# Patient Record
Sex: FEMALE | Race: OTHER | NOT HISPANIC OR LATINO | URBAN - METROPOLITAN AREA
[De-identification: names, ages, dates, MRNs, and addresses within clinical notes are randomized per-mention and may not be internally consistent; named-entity substitution may affect disease eponyms.]

---

## 2017-05-08 ENCOUNTER — EMERGENCY (EMERGENCY)
Facility: HOSPITAL | Age: 32
LOS: 1 days | Discharge: HOME | End: 2017-05-08
Admitting: EMERGENCY MEDICINE
Payer: COMMERCIAL

## 2017-05-08 VITALS
HEART RATE: 85 BPM | SYSTOLIC BLOOD PRESSURE: 125 MMHG | RESPIRATION RATE: 16 BRPM | OXYGEN SATURATION: 98 % | TEMPERATURE: 98 F | DIASTOLIC BLOOD PRESSURE: 86 MMHG

## 2017-05-08 PROCEDURE — 99283 EMERGENCY DEPT VISIT LOW MDM: CPT

## 2017-05-08 RX ORDER — SODIUM CHLORIDE 9 MG/ML
1000 INJECTION INTRAMUSCULAR; INTRAVENOUS; SUBCUTANEOUS ONCE
Qty: 0 | Refills: 0 | Status: DISCONTINUED | OUTPATIENT
Start: 2017-05-08 | End: 2017-05-08

## 2017-05-08 RX ORDER — CEPHALEXIN 500 MG
1 CAPSULE ORAL
Qty: 14 | Refills: 0 | OUTPATIENT
Start: 2017-05-08 | End: 2017-05-15

## 2017-05-08 NOTE — ED ADULT TRIAGE NOTE - CHIEF COMPLAINT QUOTE
C/o right buttock cyst x 1 week. Pt applied "cole" to area. Reports that she developed a rash that has spread to vaginal area and inner thighs. Has applied cold compress and taken benadryl with minimal relief.

## 2017-05-08 NOTE — ED PROVIDER NOTE - MEDICAL DECISION MAKING DETAILS
30 y/o female c/o right labia/inguinal area pruritis/irritaion  -probable contact dermatitis  -benadryl , topical   -abx to protect from super infection. 32 y/o female c/o right labia/inguinal area pruritis/irritaion  -probable contact dermatitis  -benadryl , topical hydrocortisone ( discussed with OB/GYN - safe to use)  -abx to protect from super infection.

## 2017-05-08 NOTE — ED PROVIDER NOTE - OBJECTIVE STATEMENT
30 y/o female no PMH presents to ED c/o genital itching/irritation. Pt. states for the past week she had small pimple to her right lower inner thigh. 2 days ago tried putting turpentine on the area (southern remedy - supposed to draw area to a head) - states she put too much turpentine on area and got on right labial area and developed some mild swelling and increased itching to area which worsened yesterday  states she has been scratching area a lot and the right labia majora and inner thigh is raw and irritated/swollen. Tried taking some benadryl today which helped with itching and irritation but symptoms still present. Denies fever chills bleeding pus numbness tingling.

## 2017-05-08 NOTE — ED PROVIDER NOTE - GENITOURINARY EXTERNAL GENERAL. FEMALE
right labia majora mildly swollen and erythemaous/raw from scratching. no fluctuance or induration to area.  area is soft and nontedner to palpation.  right inner inguinal ro with slight eyrhtema and excoritations present. warm to touch. non tender to palption

## 2019-12-18 ENCOUNTER — RESULT REVIEW (OUTPATIENT)
Age: 34
End: 2019-12-18

## 2020-05-02 ENCOUNTER — EMERGENCY (EMERGENCY)
Facility: HOSPITAL | Age: 35
LOS: 1 days | Discharge: HOME | End: 2020-05-02
Attending: EMERGENCY MEDICINE | Admitting: EMERGENCY MEDICINE
Payer: COMMERCIAL

## 2020-05-02 VITALS
OXYGEN SATURATION: 100 % | TEMPERATURE: 98 F | DIASTOLIC BLOOD PRESSURE: 84 MMHG | SYSTOLIC BLOOD PRESSURE: 131 MMHG | HEART RATE: 110 BPM | RESPIRATION RATE: 18 BRPM

## 2020-05-02 VITALS — TEMPERATURE: 98 F | HEART RATE: 88 BPM | OXYGEN SATURATION: 99 % | RESPIRATION RATE: 18 BRPM

## 2020-05-02 PROCEDURE — 29515 APPLICATION SHORT LEG SPLINT: CPT | Mod: RT

## 2020-05-02 PROCEDURE — 99284 EMERGENCY DEPT VISIT MOD MDM: CPT | Mod: 25

## 2020-05-02 PROCEDURE — 73630 X-RAY EXAM OF FOOT: CPT | Mod: 26,RT

## 2020-05-02 RX ORDER — OXYCODONE HYDROCHLORIDE 5 MG/1
5 TABLET ORAL ONCE
Refills: 0 | Status: DISCONTINUED | OUTPATIENT
Start: 2020-05-02 | End: 2020-05-02

## 2020-05-02 RX ORDER — ACETAMINOPHEN 500 MG
650 TABLET ORAL ONCE
Refills: 0 | Status: DISCONTINUED | OUTPATIENT
Start: 2020-05-02 | End: 2020-05-06

## 2020-05-02 RX ADMIN — OXYCODONE HYDROCHLORIDE 5 MILLIGRAM(S): 5 TABLET ORAL at 22:16

## 2020-05-02 NOTE — ED PROVIDER NOTE - PATIENT PORTAL LINK FT
You can access the FollowMyHealth Patient Portal offered by [facility] by registering at the following website: [email or web address]. By joining [facility] FollowMyHealth portal, you will also be able to view your health information using other applications (apps) compatible with our system.

## 2020-05-02 NOTE — ED PROVIDER NOTE - CLINICAL SUMMARY MEDICAL DECISION MAKING FREE TEXT BOX
33 y/o F w/ right foot pain s/p bearing weight and playing with daughter. Rt foot NVI, exquisite tenderness to rt 5th metatarsal. XR rt foot reveals 5th metatarsal fracture. Pain control with tylenol and oxy 5. Splinted, hard sole shoe given. F/u podiatry.

## 2020-05-02 NOTE — ED PROVIDER NOTE - NSFOLLOWUPINSTRUCTIONS_ED_ALL_ED_FT
You were seen in the Emergency Department for a right 5th metatarsal fracture.   1) Advance activity as tolerated.    2) Continue all previously prescribed medications as directed.    3) Follow up with your primary care physician in 24-48 hours - take copies of your results.  Follow-up with the podiatry clinic, a referral sheet is attached to your discharge paperwork.   4) Return to the Emergency Department for worsening or persistent symptoms, and/or ANY NEW OR CONCERNING SYMPTOMS including but not limited to severely worsening right foot pain, significant swelling/tenderness, inability to move foot, fevers/chills.

## 2020-05-02 NOTE — ED PROVIDER NOTE - OBJECTIVE STATEMENT
35 y/o F w/ right 5th metatarsal injury 2 weeks ago (seen at , x-ray done), here today after bearing weight onto right foot while playing with daughter. States she felt something pop, and pain has been worsening. Took naproxen at 730am, and advil at 430 pm with minimal relief. Denies pmh, meds, allergies, or smoking/drinking/illicit drug use.

## 2020-05-02 NOTE — ED PROVIDER NOTE - PHYSICAL EXAMINATION
[Const] well-appearing, resting comfortably, no acute distress  [HEENT] PERRL, EOMI, moist mucus membranes  [Neck] Supple, trachea midline  [CV] +S1/S2, no m/r/g appreciated  [Lungs] Clear to auscultations bilaterally, no adventitious lung sounds  [Abd] soft, non-tender, nondistended in all 4 quadrants  [MSK]  TTP at base of 5th metatarsal, no navicular or other bony foot TTP. NVI distally.  [Skin] warm, dry, well-perfused  [Neuro] A&Ox3, Cranial Nerves II-XII intact

## 2020-05-02 NOTE — ED PROVIDER NOTE - ATTENDING CONTRIBUTION TO CARE
Tamcandywianaid: 35 yo female c/o 2 weeks of right foot pain which acutely worsened after bearing weigh today. Pt seen one week [place] [facility] [place] diagnosed with 5th metatarsal fx but today presents because pain and swelling are worse after trying to prevent her daughter form falling. No paresthesias or weakness. No head trauma or LOC. No other injuries or complaints. Exam: GENERAL: well appearing, NAD, HEENT: MMM, PERRLA, CARDIO: +S1/S2, no murmurs, rubs or gallops, LUNGS: CTA B/L, no wheezing, rales or rhonchi, ABD: soft, nontender, BSx4 quadrants, no guarding or rigidity. EXT:+ TTP at base of 5th metatarsal, no navicular or other bony foot TTP. NVI distally. NEURO: AxOx3, SKIN: no rashes or lesions, well perfused A/P- 33yo female with likely metatarsal fx. will obtain xray to assess for displacement and d/c to f/u with podiatry.

## 2020-05-02 NOTE — ED ADULT TRIAGE NOTE - CHIEF COMPLAINT QUOTE
pt has chip on the side of her metatursil on rt foot that occurred 2 weeks ago  pt reinjured foot today.  states she heard a pop  / c/o pain to rt foot

## 2020-05-02 NOTE — ED PROVIDER NOTE - GASTROINTESTINAL NEGATIVE STATEMENT, MLM
- ? exacerabtion of lupus   - rec rheumatology consult     no abdominal pain, no bloating, no constipation, no diarrhea, no nausea and no vomiting.

## 2021-05-17 ENCOUNTER — OUTPATIENT (OUTPATIENT)
Dept: OUTPATIENT SERVICES | Facility: HOSPITAL | Age: 36
LOS: 1 days | End: 2021-05-17
Payer: COMMERCIAL

## 2021-05-17 ENCOUNTER — APPOINTMENT (OUTPATIENT)
Dept: ULTRASOUND IMAGING | Facility: IMAGING CENTER | Age: 36
End: 2021-05-17
Payer: COMMERCIAL

## 2021-05-17 DIAGNOSIS — R10.2 PELVIC AND PERINEAL PAIN: ICD-10-CM

## 2021-05-17 PROCEDURE — 76856 US EXAM PELVIC COMPLETE: CPT | Mod: 26

## 2021-05-17 PROCEDURE — 76830 TRANSVAGINAL US NON-OB: CPT

## 2021-05-17 PROCEDURE — 76830 TRANSVAGINAL US NON-OB: CPT | Mod: 26

## 2021-05-17 PROCEDURE — 76856 US EXAM PELVIC COMPLETE: CPT

## 2022-06-29 ENCOUNTER — RESULT REVIEW (OUTPATIENT)
Age: 37
End: 2022-06-29

## 2023-08-11 NOTE — ED PROVIDER NOTE - CONSTITUTIONAL, MLM
Render Post-Care Instructions In Note?: no Medical Necessity Information: It is in your best interest to select a reason for this procedure from the list below. All of these items fulfill various CMS LCD requirements except the new and changing color options. Spray Paint Text: The liquid nitrogen was applied to the skin utilizing a spray paint frosting technique. Show Applicator Variable?: Yes Consent: The patient's consent was obtained including but not limited to risks of crusting, scabbing, blistering, scarring, darker or lighter pigmentary change, recurrence, incomplete removal and infection. Pared With?: 15 blade Medical Necessity Clause: This procedure was medically necessary because the lesions that were treated were: Post-Care Instructions: I reviewed with the patient in detail post-care instructions. Patient is to wear sunprotection, and avoid picking at any of the treated lesions. Pt may apply Vaseline to crusted or scabbing areas. normal... Duration Of Freeze Thaw-Cycle (Seconds): 0 Well appearing, well nourished, awake, alert, oriented to person, place, time/situation and in no apparent distress. Number Of Freeze-Thaw Cycles: 3 freeze-thaw cycles Detail Level: Detailed

## 2024-08-21 ENCOUNTER — NON-APPOINTMENT (OUTPATIENT)
Age: 39
End: 2024-08-21

## 2025-07-01 NOTE — ED PROVIDER NOTE - TOBACCO USE
Renetta Carlos presents for a post monthly visit and has lost 19 pounds since last visit, for a total of 52, which is 25% of her excess weight and 14% of her body weight.     Wt: 322 lbs.     S/P gastric sleeve surgery 3-31-25.      Excess wt: 258 lbs. 60%= 243 lbs. 70%= 217 lbs. Heaviest wt: 398 lbs. Starting wt: 374 lbs.       Percent body weight:       Medical weight management: 10%= 337 lbs.       Ideal body weight: 59.3 kg (130 lb 11.7 oz)  Adjusted ideal body weight: 103.4 kg (228 lb 0.6 oz)      Indirect calorimetry testing results from 10-31-23: REE: 3182 Kcals: 130% of normal at 378 lbs      Incentive spirometer score: 2500 ml     Pre-op - Chair sit to stand test: 8 x/30 seconds.      An exercise assessment was administered by an Exercise Physiologist to determine current conditioning levels and appropriate heart rate for exercise. No longer belongs to [employer] and would prefer a home routine moving forward.     Past Medical History:   Diagnosis Date    Anemia     iron deficiency anemia    Anxiety     Asthma (CMD)     well managed by medication    Bronchitis     Essential (primary) hypertension     Fatty liver     Gastroesophageal reflux disease     IBS (irritable bowel syndrome)     PCOS (polycystic ovarian syndrome)     S/P gastric sleeve procedure 04/07/2025    Seizure disorder  (CMD) 2014    seizure episode related low oxygen during exercise    Sleep apnea     use CPAP       JOINT LIMITATIONS:   Occasional hip pain. Occasional knee pain, and back pain.     EXERCISE PLAN:  An exercise plan was discussed with the doctor for Renetta to discontinue abdominal restrictions by resuming/adding core exercises.     GOALS: HOME routine: 4-5 x per week: Cross train between outdoor walking with 60 second jogging intervals followed by strength and conditioning, kettle bell training, and core/floor exercises for abdominal strength.     *projected goal weight for the next 3 months: 293-299 lbs or less.     The  patient verbally showed a clear understanding of instructions and will follow up for next follow up visit.    Currently meeting exercise expectations.    Time spent with patient 15 minutes via telephone visit.    Electronically signed by: Tobin Amador MS,EP     Never smoker